# Patient Record
Sex: FEMALE
[De-identification: names, ages, dates, MRNs, and addresses within clinical notes are randomized per-mention and may not be internally consistent; named-entity substitution may affect disease eponyms.]

---

## 2022-01-28 NOTE — RAD REPORT
EXAM DESCRIPTION:  RAD - Chest Pa And Lat (2 Views) - 1/28/2022 9:42 am

 

CLINICAL HISTORY:  pre op pending larygoscopy

Chest pain.

 

COMPARISON:  No comparisons

 

FINDINGS:  The lungs are clear. The heart is upper limit of normal in size. No displaced fractures.

 

IMPRESSION:  No acute or concerning finding suspected.

## 2022-01-31 ENCOUNTER — HOSPITAL ENCOUNTER (OUTPATIENT)
Dept: HOSPITAL 97 - OR | Age: 52
Discharge: HOME | End: 2022-01-31
Attending: OTOLARYNGOLOGY
Payer: COMMERCIAL

## 2022-01-31 VITALS — SYSTOLIC BLOOD PRESSURE: 145 MMHG | OXYGEN SATURATION: 98 % | DIASTOLIC BLOOD PRESSURE: 68 MMHG

## 2022-01-31 VITALS — TEMPERATURE: 97.9 F

## 2022-01-31 DIAGNOSIS — Z20.822: ICD-10-CM

## 2022-01-31 DIAGNOSIS — D02.0: Primary | ICD-10-CM

## 2022-01-31 PROCEDURE — 71046 X-RAY EXAM CHEST 2 VIEWS: CPT

## 2022-01-31 PROCEDURE — 0CBR8ZX EXCISION OF EPIGLOTTIS, VIA NATURAL OR ARTIFICIAL OPENING ENDOSCOPIC, DIAGNOSTIC: ICD-10-PCS

## 2022-01-31 PROCEDURE — 31535 LARYNGOSCOPY W/BIOPSY: CPT

## 2022-01-31 PROCEDURE — 88305 TISSUE EXAM BY PATHOLOGIST: CPT

## 2022-01-31 PROCEDURE — 0CBT8ZX EXCISION OF RIGHT VOCAL CORD, VIA NATURAL OR ARTIFICIAL OPENING ENDOSCOPIC, DIAGNOSTIC: ICD-10-PCS

## 2022-01-31 PROCEDURE — 88331 PATH CONSLTJ SURG 1 BLK 1SPC: CPT

## 2022-01-31 RX ADMIN — MEPERIDINE HYDROCHLORIDE ONE MG: 25 INJECTION, SOLUTION INTRAMUSCULAR; INTRAVENOUS; SUBCUTANEOUS at 12:00

## 2022-01-31 RX ADMIN — LIDOCAINE HYDROCHLORIDE AND EPINEPHRINE ONE ML: 10; 10 INJECTION, SOLUTION INFILTRATION; PERINEURAL at 10:12

## 2022-01-31 RX ADMIN — MEPERIDINE HYDROCHLORIDE ONE MG: 25 INJECTION, SOLUTION INTRAMUSCULAR; INTRAVENOUS; SUBCUTANEOUS at 11:55

## 2022-01-31 RX ADMIN — LIDOCAINE HYDROCHLORIDE AND EPINEPHRINE ONE ML: 10; 10 INJECTION, SOLUTION INFILTRATION; PERINEURAL at 10:28

## 2022-02-01 NOTE — OP
Date of Procedure:  01/31/2022



Surgeon:  WALLACE LARA



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



Postoperative Diagnosis:  Atypical squamous cells-right false vocal cord.



Procedure:  Direct laryngoscopy with biopsies of the right false vocal cord, 
right arytenoid, and tip of epiglottis.



Anesthesia:  General endotracheal anesthesia was administered.  We utilized 
Afrin and epinephrine for hemostasis during the procedure after the biopsies 
were taken.



Specimens:  Multiple specimens were removed from the right false vocal cord, 
right arytenoid, and tip of the epiglottis.



Estimated Blood Loss:  Approximately 10 to 15 mL.



Findings:  Friable, pink, bulky neoplasm involving the right false vocal cord 
and extending posteriorly to the right arytenoid.  The patient also had a 
friable, lumpy appearance to the mucosa involving the tip of epiglottis, which 
was also suspicious for neoplasm.



Complications:  None.



Disposition:  Stable.  The patient tolerated the procedure well.



Indication For Procedure:  The patient is a pleasant 51-year-old female, who 
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 
neoplasm, which was confirmed on a flexible nasopharyngeal laryngoscope in my 
clinic.  I started the patient on prednisone, steroids to help reduce 
inflammatory component of the neoplasm and the patient was told that she needed 
to schedule this procedure as soon as possible.  We also discussed that a 
possible tracheostomy may be needed to secure the airway.  She understood and 
all questions were answered.  Risks versus benefits and complications were 
explained in detail and a consent form was signed which placed in the chart.



Description Of Procedure:  The patient was transferred from the preoperative 
holding area to the operative suite by Department of Anesthesia, placed on the 
operating table supine, sedated and was intubated with a size 5.5 endotracheal 
tube.  The patient was then rotated approximately 60 to 75 degrees and the neck 
was slightly extended.  We placed a bite guard over the upper dentition and then
I utilized a direct laryngoscope and was able to follow the endotracheal tube 
down to the laryngeal complex and then I suspended the scope on a Thompson stand.  
Photo documentation was obtained with a 0 degree telescope.  The patient had a 
bulky, friable tumor involving the right false vocal cord anteriorly and 
extending posteriorly to the right arytenoid.  Multiple biopsies were taken and 
several were sent for frozen section analysis which revealed a preliminary 
report of atypical squamous cells; permanent pathology results were forthcoming.
 Hemostasis was achieved with epinephrine 1:1000 as well as Afrin soaked 
pledgets.  Once the laryngeal biopsies were taken, I then moved to the area of 
the epiglottis and the epiglottis mucosa looked very friable and had a 
cobblestone appearance and there were areas where I visualized possible 
neoplasm.  Thus, I took multiple biopsies from the tip of epiglottis and 
submitted those as well for permanent section.  I visualized all areas other 
areas of the hypopharynx, larynx, and oropharynx.  I did not see any other 
abnormalities.  Thus, the laryngoscope was completely removed. I then did a 
bimanual palpation exam and I did not palpate any areas of abnormality of the 
oral cavity or oropharynx.  I also palpated the bilateral neck and I did not 
detect any areas of lymphadenopathy.  The patient tolerated the procedure well 
and she was transferred back to Department of Anesthesia in stable condition 
where she was subsequently extubated and transferred to the postoperative care 
unit.  She will be held in the recovery area for several hours 

and if she is able to tolerate liquids, she may be discharged home and she will 
follow up in my office in 24 hours.





ANH/NICK

DD:  01/31/2022 17:10:56   Voice ID:  [id number]

DT:  02/01/2022 03:30:22   Report ID:  [id number]

MTDD

## 2022-04-25 ENCOUNTER — HOSPITAL ENCOUNTER (EMERGENCY)
Dept: HOSPITAL 97 - ER | Age: 52
Discharge: ANOTHER HOSPITAL | End: 2022-04-25
Payer: COMMERCIAL

## 2022-04-25 VITALS — OXYGEN SATURATION: 94 % | DIASTOLIC BLOOD PRESSURE: 93 MMHG | SYSTOLIC BLOOD PRESSURE: 131 MMHG

## 2022-04-25 DIAGNOSIS — Z20.822: ICD-10-CM

## 2022-04-25 DIAGNOSIS — I10: ICD-10-CM

## 2022-04-25 DIAGNOSIS — F17.210: ICD-10-CM

## 2022-04-25 DIAGNOSIS — L03.115: Primary | ICD-10-CM

## 2022-04-25 DIAGNOSIS — I96: ICD-10-CM

## 2022-04-25 DIAGNOSIS — M86.9: ICD-10-CM

## 2022-04-25 DIAGNOSIS — I70.201: ICD-10-CM

## 2022-04-25 LAB
BLD SMEAR INTERP: (no result)
BUN BLD-MCNC: 16 MG/DL (ref 7–18)
CRP SERPL-MCNC: 45.7 MG/L (ref ?–3)
GLUCOSE SERPLBLD-MCNC: 149 MG/DL (ref 74–106)
HCT VFR BLD CALC: 37.1 % (ref 36–45)
INR BLD: 1.05
LYMPHOCYTES # SPEC AUTO: 0.4 K/UL (ref 0.7–4.9)
MORPHOLOGY BLD-IMP: (no result)
PMV BLD: 8.2 FL (ref 7.6–11.3)
POTASSIUM SERPL-SCNC: 4.2 MMOL/L (ref 3.5–5.1)
RBC # BLD: 4.13 M/UL (ref 3.86–4.86)

## 2022-04-25 PROCEDURE — 80048 BASIC METABOLIC PNL TOTAL CA: CPT

## 2022-04-25 PROCEDURE — 85730 THROMBOPLASTIN TIME PARTIAL: CPT

## 2022-04-25 PROCEDURE — 85610 PROTHROMBIN TIME: CPT

## 2022-04-25 PROCEDURE — 93971 EXTREMITY STUDY: CPT

## 2022-04-25 PROCEDURE — 87205 SMEAR GRAM STAIN: CPT

## 2022-04-25 PROCEDURE — 84145 PROCALCITONIN (PCT): CPT

## 2022-04-25 PROCEDURE — 87077 CULTURE AEROBIC IDENTIFY: CPT

## 2022-04-25 PROCEDURE — 93926 LOWER EXTREMITY STUDY: CPT

## 2022-04-25 PROCEDURE — 85652 RBC SED RATE AUTOMATED: CPT

## 2022-04-25 PROCEDURE — 73630 X-RAY EXAM OF FOOT: CPT

## 2022-04-25 PROCEDURE — 99285 EMERGENCY DEPT VISIT HI MDM: CPT

## 2022-04-25 PROCEDURE — 86140 C-REACTIVE PROTEIN: CPT

## 2022-04-25 PROCEDURE — 87070 CULTURE OTHR SPECIMN AEROBIC: CPT

## 2022-04-25 PROCEDURE — 87186 SC STD MICRODIL/AGAR DIL: CPT

## 2022-04-25 PROCEDURE — 87040 BLOOD CULTURE FOR BACTERIA: CPT

## 2022-04-25 PROCEDURE — 36415 COLL VENOUS BLD VENIPUNCTURE: CPT

## 2022-04-25 PROCEDURE — 85025 COMPLETE CBC W/AUTO DIFF WBC: CPT

## 2022-04-25 NOTE — RAD REPORT
EXAM DESCRIPTION:  US - Extremity Venous Uni Ltd - 4/25/2022 11:14 am

 

CLINICAL HISTORY:  cancer, RLE swelling

 

COMPARISON:  None.

 

TECHNIQUE:  Real-time sonographic evaluation of the right lower extremity deep venous systems was per
formed.

 

FINDINGS:  Normal compressibility, flow augmentation, phasic flow and spontaneous flow are identified
 in the right lower extremity common femoral, superficial femoral, popliteal and posterior tibial vei
ns. No intraluminal filling defects seen.

 

IMPRESSION:  No DVT in the right lower extremity.

## 2022-04-25 NOTE — EDPHYS
Physician Documentation                                                                           

 [facility]                                                                 

Name: Shara Lyle                                                                              

Age: 51 yrs                                                                                       

Sex: Female                                                                                       

: 1970                                                                                   

MRN: [id number]                                                                                   

Arrival Date: 2022                                                                          

Time: 10:17                                                                                       

Account#: [id number]                                                                            

Bed 5                                                                                             

Private MD: Justino Meeks                                                                         

ED Physician Juvenal Whelan                                                                         

HPI:                                                                                              

                                                                                             

10:37 This 51 yrs old Female presents to ER via Ambulatory with complaints of Wound Check -   rn  

      foot.                                                                                       

10:38 The patient presents with cellulitis of the right leg. Onset: The symptoms/episode      rn  

      began/occurred at an unknown time. Possible cause(s): unknown. Associated signs and         

      symptoms: Pertinent positives: drainage, erythema, swelling, Pertinent negatives:           

      shortness of breath, vomiting. Modifying factors: the symptoms are alleviated by            

      nothing, the symptoms are aggravated by nothing. Severity of symptoms: At their worst       

      the symptoms were mild, in the emergency department the symptoms are unchanged. The         

      patient has not experienced similar symptoms in the past. The patient has been recently     

      seen by a physician:. Pt reports sent here by her cancer doctor for foot wounds.            

      Reports wounds/black toes, right foot, for 4 weeks atleast, with mild drainage from         

      right great toe. Got worse this week. No fever. + redness and swelling to RLE as well.      

      No trauma. .                                                                                

                                                                                                  

OB/GYN:                                                                                           

12:30 LMP N/A - Irregular menses                                                              jg9 

                                                                                                  

Historical:                                                                                       

- Allergies:                                                                                      

10:58 No Known Allergies;                                                                     jg9 

- PMHx:                                                                                           

10:58 diabetes?; Hypertensive disorder;                                                       jg9 

                                                                                                  

- Immunization history:: Adult Immunizations up to date.                                          

- Family history:: not pertinent.                                                                 

- Social history:: Smoking status: Patient reports the use of cigarette tobacco                   

  products, smokes one-half pack cigarettes per day.                                              

- Hospitalizations: : No recent hospitalization is reported.                                      

                                                                                                  

                                                                                                  

ROS:                                                                                              

10:38 Constitutional: Negative for fever, chills, and weight loss, Eyes: Negative for injury, rn  

      pain, redness, and discharge, Neck: Negative for injury, pain, and swelling,                

      Cardiovascular: Negative for chest pain, palpitations, and edema, Respiratory: Negative     

      for shortness of breath, cough, wheezing, and pleuritic chest pain, Abdomen/GI:             

      Negative for abdominal pain, nausea, vomiting, diarrhea, and constipation, Back:            

      Negative for injury and pain, MS/Extremity: Negative for injury and deformity, Skin: +      

      redness of skin, + open wounds to right foot Neuro: Negative for headache, weakness,        

      numbness, tingling, and seizure.                                                            

                                                                                                  

Exam:                                                                                             

10:38 Constitutional:  This is a well developed, well nourished patient who is awake, alert,  rn  

      and in no acute distress. Head/Face:  Normocephalic, atraumatic. Eyes:  Periorbital         

      areas with no swelling, redness, or edema. Cardiovascular:  Regular rate and rhythm.        

      No pulse deficits. Respiratory:  No increased work of breathing, no retractions or          

      nasal flaring. Abdomen/GI:  Soft, non-tender Skin:  Redness of RLE with warmth, +           

      open/gangrenous wounds of RLE including distal/lateral leg, 1st toe, 5th toe.  + mild       

      drainage and foul smell from 1st toe of right foot.  MS/ Extremity:  No cyanosis. Equal     

      cap refill of 3 sec, no definite palpable pulse.  Neuro:  Awake and alert, GCS 15           

                                                                                                  

Vital Signs:                                                                                      

10:27  / 94; Pulse 70; Resp 16; Pulse Ox 99% on R/A; Weight 86.32 kg; Height 5 ft. 5    jg9 

      in. (165.10 cm) (R);                                                                        

10:45  / 87; Pulse 66; Resp 18 S; Pulse Ox 97% on R/A; Pain 0/10;                       jg9 

11:49  / 95; Pulse 65; Resp 16 S; Pulse Ox 97% on R/A;                                  jd3 

12:00  / 73; Pulse 65; Resp 17 S; Pulse Ox 97% ;                                        jg9 

12:30  / 77; Pulse 69; Resp 15 S; Pulse Ox 98% on R/A;                                  jg9 

13:00  / 100; Pulse 67; Resp 22; Pulse Ox 100% on R/A; Pain 8/10;                       jg9 

13:30  / 99; Pulse 71; Resp 22 S; Pulse Ox 99% ; Pain 8/10;                             jg9 

14:00  / 89; Pulse 67; Resp 14 S; Pulse Ox 98% on R/A;                                  jg9 

14:30  / 92; Pulse 63; Resp 17 S; Pulse Ox 95% ;                                        jg9 

15:00  / 89; Pulse 68; Resp 17 S; Pulse Ox 93% on R/A;                                  jg9 

15:30  / 90; Pulse 63; Resp 20 S; Pulse Ox 96% on R/A;                                  jg9 

16:00  / 77; Pulse 68; Resp 14 S; Pulse Ox 97% ;                                        jg9 

17:00  / 96; Pulse 69; Resp 13 S; Pulse Ox 98% on R/A;                                  jg9 

18:00  / 93; Pulse 65; Resp 14 S; Pulse Ox 94% on R/A; Pain 8/10;                       jg9 

10:27 Body Mass Index 31.67 (86.32 kg, 165.10 cm)                                             j9 

                                                                                                  

MDM:                                                                                              

10:28 Patient medically screened.                                                             rn  

11:47 Differential diagnosis: cellulitis, arterial occlusion, arterial insufficiency. Data    rn  

      reviewed: vital signs, nurses notes, lab test result(s), radiologic studies, doppler,       

      and as a result, I will admit patient. Counseling: I had a detailed discussion with the     

      patient and/or guardian regarding: the historical points, exam findings, and any            

      diagnostic results supporting the discharge/admit diagnosis, lab results, radiology         

      results, the need to transfer to another facility, for higher level of care, [facility] does not immediately have the required specialist. Response to            

      treatment: the patient's symptoms have mildly improved after treatment, and as a            

      result, I will admit patient. Admission orders: after a detailed discussion of the          

      patient's condition and case, the admit orders are written by me. ED course: Pt with        

      occlusion of right SFA, some collateral flow to popliteal and distal, no vascular here,     

      will transfer for vascular consultation. .                                                  

12:34 ED course: Accepted for transfer at 12:32 by [facility]. .                               rn  

16:47 ED course: Still waiting for administrative clearance from [facility], have acceptance   rn  

      but still waiting on bed. .                                                                 

                                                                                                  

                                                                                             

10:36 Order name: CBC with Diff                                                               rn  

                                                                                             

10:36 Order name: Basic Metabolic Panel; Complete Time: 11:                                 rn  

                                                                                             

10:36 Order name: Protime (+inr); Complete Time: 11:                                        rn  

                                                                                             

10:36 Order name: Ptt, Activated; Complete Time: 11:                                        rn  

                                                                                             

10:36 Order name: ESR                                                                         rn  

                                                                                             

10:36 Order name: CRP; Complete Time: 11:28                                                   rn  

                                                                                             

10:36 Order name: Extremity Venous Uni Ltd US; Complete Time: 11:28                           rn  

                                                                                             

10:36 Order name: Lower Extremity Artery Uni Ltd US; Complete Time: 12:09                     rn  

                                                                                             

10:36 Order name: Blood Culture Adult (2)                                                     rn  

                                                                                             

10:36 Order name: Procalcitonin; Complete Time: 12:09                                         rn  

                                                                                             

10:38 Order name: Wound Culture                                                               rn  

                                                                                             

10:54 Order name: SARS-COV-2 RT PCR (Document "Date of Onset" if Symptomatic); Complete Time: iw  

      12:                                                                                             

13:54 Order name: CBC Smear Scan                                                              EDMS

                                                                                             

17:45 Order name: Ptt, Activated                                                              jg9 

                                                                                             

10:36 Order name: IV Start; Complete Time: 10:53                                              rn  

                                                                                             

10:36 Order name: XRAY Foot RIGHT 3 View; Complete Time: 13:14                                rn  

                                                                                                  

Administered Medications:                                                                         

10:52 Drug: vancoMYCIN 1 grams Route: IVPB; Infused Over: 2 hrs; Site: right hand;            jg9 

13:55 Follow up: IV Status: Completed infusion; IV Intake: 250ml                              jg9 

10:52 Drug: Cefepime 1 grams Route: IVPB; Rate: 200 ml/hr; Infused Over: 30 mins; Site: right 9 

      antecubital;                                                                                

11:30 Follow up: IV Status: Completed infusion; IV Intake: 100ml                              jg9 

13:25 Drug: Zofran (Ondansetron) 4 mg Route: IVP; Site: right hand;                           jg9 

14:11 Follow up: Response: No adverse reaction                                                jg9 

13:27 Drug: morphine 4 mg {Note: RASS-0.} Route: IVP; Site: right hand;                       jg9 

14:11 Follow up: Response: No adverse reaction; Pain is decreased                             jg9 

13:45 Drug: Heparin (DVT/PE Drip) 18 units/kg/hr - (HEParin 33398 units, D5W 500 ml)          jg9 

      {Co-Signature: jlilliana (Pancho Castaneda RN).} Route: IV; Rate: calculated rate; Site: right     

      antecubital;                                                                                

21:17 Follow up: IV Status: Infusion continued upon transfer                                  as6 

13:50 Drug: Ativan (LORazepam) 1 mg Route: PO;                                                jg9 

14:11 Follow up: Response: No adverse reaction; Anxiety decreased; RASS: Alert and Calm (0)   g9 

18:08 Drug: morphine 4 mg {Note: RASS-0.} Route: IVP; Site: right hand;                       jg9 

18:29 Follow up: Response: No adverse reaction; Pain is decreased                             g9 

                                                                                                  

                                                                                                  

Disposition Summary:                                                                              

22 11:51                                                                                    

Transfer Ordered                                                                                  

      Transfer Location: [facility]                                rn  

      Reason: Higher level of care                                                            rn  

      Condition: Stable                                                                       rn  

      Problem: new                                                                            rn  

      Symptoms: are unchanged                                                                 rn  

      Accepting Physician: (22 21:17)                                                as6 

      Diagnosis                                                                                   

        - Right Superficial Femoral artery occlusion                                          rn  

        - Cellulitis of right lower limb                                                      rn  

        - Gangrene, not elsewhere classified                                                  rn  

        - Osteomyelitis, unspecified                                                          rn  

      Forms:                                                                                      

        - Medication Reconciliation Form                                                      rn  

        - SBAR form                                                                           rn  

Signatures:                                                                                       

Dispatcher [employer]                           EDMS                                                 

Juvenal Whelan MD MD rn Slawson, Ashby, RN                      RN   as6                                                  

Eden Barnhart RN                   RN   jg9                                                  

Pancho Castaneda RN                           jd3                                                  

                                                                                                  

Corrections: (The following items were deleted from the chart)                                    

13:14 11:51  rn                                                                            rn  

21:17 13:14 Dr. aiken                                                                            as6 

                                                                                                  

**************************************************************************************************

## 2022-04-25 NOTE — RAD REPORT
EXAM DESCRIPTION:  RAD - Foot Right 3 View - 4/25/2022 12:06 pm

 

CLINICAL HISTORY:  wounds 1st/5th toe, possible osteo

 

COMPARISON:  Lower Extremity Artery Uni Ltd dated 4/25/2022

 

FINDINGS:  Wound along the lateral aspect of the foot at the level of the fifth MTP joint. There is a
lso a wound at the tip of the first toe. The first distal phalangeal tuft is partially obscured and m
ay be eroded. This could be secondary to osteomyelitis. At the fifth MTP joint, no definite evidence 
of osteomyelitis is identified. No acute fractures identified. Calcaneal spur.

 

IMPRESSION:  1. Findings are suspicious for osteomyelitis at the great toe distal phalangeal tuft.

2. No conclusive radiographic findings to suggest osteomyelitis at the fifth MTP joint. MRI is more s
ensitive in the acute phase.

## 2022-04-25 NOTE — RAD REPORT
EXAM DESCRIPTION:  US - Lower Extremity Artery [facility] - 4/25/2022 11:14 am

 

CLINICAL HISTORY:  wounds, cancer patient currently undergoing chemotherapy

 

Preliminary findings were provided to the referring physician at the time of the study.

 

COMPARISON:  No comparisons

 

TECHNIQUE:  Doppler evaluation of the right lower extremity arterial tree performed. Waveforms and ve
locity values were obtained along with visual inspection.

 

FINDINGS:  Right common femoral artery is patent with a biphasic to triphasic waveform pattern. Promi
nent atherosclerotic wall calcifications are present in the superficial femoral artery. No blood flow
 could be demonstrated within the superficial femoral artery from proximal to distal aspect.Popliteal
 artery is patent and may be recannulized via vessels near the adductor canal. A monophasic waveform 
pattern is present in the right popliteal, posterior tibial and dorsalis pedis arteries.

 

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
al arteries.

 

The common femoral, popliteal, dorsalis pedis and posterior tibial arteries are patent. Wall calcific
ations are present but no areas of flow restricting lesion identified in these vessels.

## 2022-04-25 NOTE — ER
Nurse's Notes                                                                                     

 [facility]                                                                 

Name: Shara Lyle                                                                              

Age: 51 yrs                                                                                       

Sex: Female                                                                                       

: 1970                                                                                   

MRN: [id number]                                                                                   

Arrival Date: 2022                                                                          

Time: 10:17                                                                                       

Account#: [id number]                                                                            

Bed 5                                                                                             

Private MD: Justino Meeks                                                                         

Diagnosis: Right Superficial Femoral artery occlusion;Cellulitis of right lower limb;Gangrene, not

  elsewhere classified;Osteomyelitis, unspecified                                                 

                                                                                                  

Presentation:                                                                                     

                                                                                             

10:27 Chief complaint: Patient states: was sent from [facility] for eval of wound on right iw  

      great toe and right pinky toe , pt states it has been there since her cancer treatment      

      started 4 weeks ago, has hx of laryngeal cancer. Coronavirus screen: At this time, the      

      client does not indicate any symptoms associated with coronavirus-19. Ebola Screen:         

      Patient negative for fever greater than or equal to 101.5 degrees Fahrenheit, and           

      additional compatible Ebola Virus Disease symptoms Patient denies exposure to               

      infectious person. Patient denies travel to an Ebola-affected area in the 21 days           

      before illness onset. No symptoms or risks identified at this time. Initial Sepsis          

      Screen: Does the patient meet any 2 criteria? No. Patient's initial sepsis screen is        

      negative. Does the patient have a suspected source of infection? No. Patient's initial      

      sepsis screen is negative. Risk Assessment: Do you want to hurt yourself or someone         

      else? Patient reports no desire to harm self or others. Onset of symptoms was 2022.                                                                                       

10:27 Method Of Arrival: Ambulatory                                                           iw  

10:27 Acuity: PAVITHRA 3                                                                           iw  

                                                                                                  

Triage Assessment:                                                                                

10:40 General: Appears uncomfortable, Behavior is anxious.                                    jg9 

                                                                                                  

OB/GYN:                                                                                           

12:30 LMP N/A - Irregular menses                                                              jg9 

                                                                                                  

Historical:                                                                                       

- Allergies:                                                                                      

10:58 No Known Allergies;                                                                     jg9 

- PMHx:                                                                                           

10:58 diabetes?; Hypertensive disorder;                                                       jg9 

                                                                                                  

- Immunization history:: Adult Immunizations up to date.                                          

- Family history:: not pertinent.                                                                 

- Social history:: Smoking status: Patient reports the use of cigarette tobacco                   

  products, smokes one-half pack cigarettes per day.                                              

- Hospitalizations: : No recent hospitalization is reported.                                      

                                                                                                  

                                                                                                  

Screening:                                                                                        

10:54 Abuse screen: Denies threats or abuse. Denies injuries from another. Nutritional        jg9 

      screening: No deficits noted. Tuberculosis screening: No symptoms or risk factors           

      identified. Fall Risk None identified.                                                      

                                                                                                  

Assessment:                                                                                       

10:54 Pain: Complains of pain in right foot and right leg-lower leg. Derm: Skin Wound noted   jg9 

      right foot-r great toe, r pinky toe, r area on bottom of foot near pinky, r mid shin        

      area on lateral aspect. Derm: Skin Skin is red, Skin temperature is warm Wound noted.       

11:49 Reassessment: No changes from previously documented assessment. Patient and/or family   jd3 

      updated on plan of care and expected duration. Pain level reassessed. Patient is alert,     

      oriented x 3, equal unlabored respirations, skin warm/dry/pink.                             

12:36 Reassessment: Patient appears in no apparent distress at this time. Patient and/or      jg9 

      family updated on plan of care and expected duration. Pain level reassessed. Patient is     

      alert, oriented x 3, equal unlabored respirations, skin warm/dry/pink.                      

18:39 Reassessment: report called to MIKEY Haro at [facility].                     jg9 

                                                                                                  

Vital Signs:                                                                                      

10:27  / 94; Pulse 70; Resp 16; Pulse Ox 99% on R/A; Weight 86.32 kg; Height 5 ft. 5    jg9 

      in. (165.10 cm) (R);                                                                        

10:45  / 87; Pulse 66; Resp 18 S; Pulse Ox 97% on R/A; Pain 0/10;                       jg9 

11:49  / 95; Pulse 65; Resp 16 S; Pulse Ox 97% on R/A;                                  jd3 

12:00  / 73; Pulse 65; Resp 17 S; Pulse Ox 97% ;                                        jg9 

12:30  / 77; Pulse 69; Resp 15 S; Pulse Ox 98% on R/A;                                  jg9 

13:00  / 100; Pulse 67; Resp 22; Pulse Ox 100% on R/A; Pain 8/10;                       jg9 

13:30  / 99; Pulse 71; Resp 22 S; Pulse Ox 99% ; Pain 8/10;                             jg9 

14:00  / 89; Pulse 67; Resp 14 S; Pulse Ox 98% on R/A;                                  jg9 

14:30  / 92; Pulse 63; Resp 17 S; Pulse Ox 95% ;                                        jg9 

15:00  / 89; Pulse 68; Resp 17 S; Pulse Ox 93% on R/A;                                  jg9 

15:30  / 90; Pulse 63; Resp 20 S; Pulse Ox 96% on R/A;                                  jg9 

16:00  / 77; Pulse 68; Resp 14 S; Pulse Ox 97% ;                                        jg9 

17:00  / 96; Pulse 69; Resp 13 S; Pulse Ox 98% on R/A;                                  jg9 

18:00  / 93; Pulse 65; Resp 14 S; Pulse Ox 94% on R/A; Pain 8/10;                       jg9 

10:27 Body Mass Index 31.67 (86.32 kg, 165.10 cm)                                             jg9 

                                                                                                  

ED Course:                                                                                        

10:17 Patient arrived in ED.                                                                  am2 

10:18 Justino Meeks MD is Private Physician.                                                 am2 

10:25 Eden Barnhart, RN is Primary Nurse.                                                 jg9 

10:28 Juvenal Whelan MD is Attending Physician.                                                rn  

10:29 Triage completed.                                                                       iw  

10:30 Arm band placed on.                                                                     iw  

10:30 Inserted saline lock: 22 gauge in right antecubital area, using aseptic technique.      jg9 

      Blood collected.                                                                            

10:40 Inserted saline lock: 20 gauge in right hand, using aseptic technique.                  jg9 

11:12 Patient has correct armband on for positive identification. Bed in low position. Call   jg9 

      light in reach. Side rails up X 1.                                                          

11:16 Extremity Venous Uni Ltd US In Process Unspecified.                                     EDMS

11:16 Lower Extremity Artery [facility] US In Process Unspecified.                               EDMS

11:58 initiated transfer to [facility].                                              bd  

12:08 XRAY Foot RIGHT 3 View In Process Unspecified.                                          EDMS

12:36 Appears to be sleeping.                                                                 jg9 

16:58 spoke to Ashwin at [facility], pt is being accepted in transfer but      bd  

      "beds are tight" Ashwin will contact house supervisor and call me back.                    

17:30 pt accepted in transfer to [facility] by dr ADE Vela admin approval given by      estefania Westfall. pt going to room 911.                                                    

19:13 Primary Nurse role handed off by Eden Barnhart, RN                                  mw2 

21:08 Zahra Garvey, RN is Primary Nurse.                                                    kd3 

21:16 No provider procedures requiring assistance completed. Patient transferred, IV remains  as6 

      in place.                                                                                   

                                                                                                  

Administered Medications:                                                                         

10:52 Drug: vancoMYCIN 1 grams Route: IVPB; Infused Over: 2 hrs; Site: right hand;            jg9 

13:55 Follow up: IV Status: Completed infusion; IV Intake: 250ml                              jg9 

10:52 Drug: Cefepime 1 grams Route: IVPB; Rate: 200 ml/hr; Infused Over: 30 mins; Site: right j9 

      antecubital;                                                                                

11:30 Follow up: IV Status: Completed infusion; IV Intake: 100ml                              jg9 

13:25 Drug: Zofran (Ondansetron) 4 mg Route: IVP; Site: right hand;                           jg9 

14:11 Follow up: Response: No adverse reaction                                                jg9 

13:27 Drug: morphine 4 mg {Note: RASS-0.} Route: IVP; Site: right hand;                       jg9 

14:11 Follow up: Response: No adverse reaction; Pain is decreased                             jg9 

13:45 Drug: Heparin (DVT/PE Drip) 18 units/kg/hr - (HEParin 03853 units, D5W 500 ml)          jg9 

      {Co-Signature: jd3 (Pancho Castaneda RN).} Route: IV; Rate: calculated rate; Site: right     

      antecubital;                                                                                

21:17 Follow up: IV Status: Infusion continued upon transfer                                  as6 

13:50 Drug: Ativan (LORazepam) 1 mg Route: PO;                                                jg9 

14:11 Follow up: Response: No adverse reaction; Anxiety decreased; RASS: Alert and Calm (0)   jg9 

18:08 Drug: morphine 4 mg {Note: RASS-0.} Route: IVP; Site: right hand;                       jg9 

18:29 Follow up: Response: No adverse reaction; Pain is decreased                             jg9 

                                                                                                  

                                                                                                  

Intake:                                                                                           

11:30 IV: 100ml; Total: 100ml.                                                                jg9 

13:55 IV: 250ml; Total: 350ml.                                                                jg9 

                                                                                                  

Outcome:                                                                                          

11:51 ER care complete, transfer ordered by MD.                                               rn  

21:17 Transferred by ground EMS Transfer form completed.                                      as6 

21:17 Condition: stable                                                                           

21:17 Instructed on the need for transfer.                                                        

21:17 Patient left the ED.                                                                    as6 

                                                                                                  

Signatures:                                                                                       

Dispatcher [employer]                           EDElena Hassan Irene, RN RN iw Nieto, Roman, MD MD rn Moreno, Amanda                               am2                                                  

Pancho Castaneda RN                    RN   jd3                                                  

CarolinaGeraldine rodrigez                            mw2                                                  

Ray Marie RN                      RN   as6                                                  

Zahra Garvey, RN                      RN   kd3                                                  

Eden Barnhart RN                   RN   jg9                                                  

Pancho Castaneda RN                           jd3                                                  

                                                                                                  

Corrections: (The following items were deleted from the chart)                                    

10:54 10:30 Inserted saline lock: 22 gauge in right antecubital area, using aseptic           jg9 

      technique. hand, using aseptic technique. Blood collected. jg9                              

13:28 10:27  / 94; Pulse 70bpm; Resp 16bpm; Pulse Ox 99% RA; iw                         jg9 

13:36 10:27  / 94; Pulse 70bpm; Resp 16bpm; Pulse Ox 99% RA; 79.38 kg; Height 5 ft. 6   jg9 

      in. Reported; BMI: 28.2; jg9                                                                

18:18 18:00  / 93; Pulse 65bpm; Pulse Ox 94% RA; Pain 8/10; jg9                         jg9 

                                                                                                  

**************************************************************************************************